# Patient Record
Sex: MALE | Race: WHITE | NOT HISPANIC OR LATINO | Employment: OTHER | URBAN - METROPOLITAN AREA
[De-identification: names, ages, dates, MRNs, and addresses within clinical notes are randomized per-mention and may not be internally consistent; named-entity substitution may affect disease eponyms.]

---

## 2018-01-30 PROBLEM — R20.0 NUMBNESS: Status: ACTIVE | Noted: 2018-01-30

## 2018-02-01 PROBLEM — I63.9 STROKE: Status: ACTIVE | Noted: 2018-02-01

## 2018-02-01 PROBLEM — I74.9 TIA DUE TO EMBOLISM: Status: ACTIVE | Noted: 2018-02-01

## 2018-02-01 PROBLEM — G45.9 TIA DUE TO EMBOLISM: Status: ACTIVE | Noted: 2018-02-01

## 2018-02-01 PROBLEM — G45.9 TIA DUE TO EMBOLISM: Status: RESOLVED | Noted: 2018-02-01 | Resolved: 2018-02-01

## 2018-02-01 PROBLEM — I74.9 TIA DUE TO EMBOLISM: Status: RESOLVED | Noted: 2018-02-01 | Resolved: 2018-02-01

## 2018-02-01 PROBLEM — R53.1 WEAKNESS: Status: ACTIVE | Noted: 2018-02-01

## 2018-02-01 PROBLEM — I63.9 STROKE: Status: RESOLVED | Noted: 2018-02-01 | Resolved: 2018-02-01

## 2019-06-05 PROBLEM — E86.0 DEHYDRATION: Status: ACTIVE | Noted: 2019-06-05

## 2019-06-08 PROBLEM — R07.9 CHEST PAIN: Status: RESOLVED | Noted: 2019-06-08 | Resolved: 2019-06-08

## 2019-06-08 PROBLEM — E86.0 DEHYDRATION: Status: RESOLVED | Noted: 2019-06-05 | Resolved: 2019-06-08

## 2019-06-08 PROBLEM — R07.9 CHEST PAIN: Status: ACTIVE | Noted: 2019-06-08

## 2019-06-08 PROBLEM — N39.0 URINARY TRACT INFECTION WITHOUT HEMATURIA: Status: ACTIVE | Noted: 2019-06-08

## 2019-06-14 ENCOUNTER — TELEPHONE (OUTPATIENT)
Dept: UROLOGY | Facility: CLINIC | Age: 84
End: 2019-06-14

## 2019-06-14 DIAGNOSIS — R33.9 URINARY RETENTION: Primary | ICD-10-CM

## 2019-06-17 ENCOUNTER — ANESTHESIA EVENT (OUTPATIENT)
Dept: SURGERY | Facility: HOSPITAL | Age: 84
End: 2019-06-17
Payer: MEDICARE

## 2019-06-19 ENCOUNTER — ANESTHESIA (OUTPATIENT)
Dept: SURGERY | Facility: HOSPITAL | Age: 84
End: 2019-06-19
Payer: MEDICARE

## 2019-06-19 ENCOUNTER — HOSPITAL ENCOUNTER (OUTPATIENT)
Facility: HOSPITAL | Age: 84
Discharge: HOME | End: 2019-06-19
Attending: UROLOGY | Admitting: UROLOGY
Payer: MEDICARE

## 2019-06-19 DIAGNOSIS — R33.9 URINARY RETENTION: ICD-10-CM

## 2019-06-19 PROCEDURE — 63600175 PHARM REV CODE 636 W HCPCS: Mod: PO | Performed by: NURSE ANESTHETIST, CERTIFIED REGISTERED

## 2019-06-19 PROCEDURE — 36000707: Mod: PO | Performed by: UROLOGY

## 2019-06-19 PROCEDURE — 36000706: Mod: PO | Performed by: UROLOGY

## 2019-06-19 PROCEDURE — 25000003 PHARM REV CODE 250: Mod: PO | Performed by: ANESTHESIOLOGY

## 2019-06-19 PROCEDURE — 71000033 HC RECOVERY, INTIAL HOUR: Mod: PO | Performed by: UROLOGY

## 2019-06-19 PROCEDURE — 71000015 HC POSTOP RECOV 1ST HR: Mod: PO | Performed by: UROLOGY

## 2019-06-19 PROCEDURE — 37000008 HC ANESTHESIA 1ST 15 MINUTES: Mod: PO | Performed by: UROLOGY

## 2019-06-19 PROCEDURE — D9220A PRA ANESTHESIA: ICD-10-PCS | Mod: CRNA,,, | Performed by: NURSE ANESTHETIST, CERTIFIED REGISTERED

## 2019-06-19 PROCEDURE — 25000003 PHARM REV CODE 250: Mod: PO | Performed by: NURSE ANESTHETIST, CERTIFIED REGISTERED

## 2019-06-19 PROCEDURE — D9220A PRA ANESTHESIA: Mod: ANES,,, | Performed by: ANESTHESIOLOGY

## 2019-06-19 PROCEDURE — 37000009 HC ANESTHESIA EA ADD 15 MINS: Mod: PO | Performed by: UROLOGY

## 2019-06-19 PROCEDURE — D9220A PRA ANESTHESIA: ICD-10-PCS | Mod: ANES,,, | Performed by: ANESTHESIOLOGY

## 2019-06-19 PROCEDURE — C1894 INTRO/SHEATH, NON-LASER: HCPCS | Mod: PO | Performed by: UROLOGY

## 2019-06-19 PROCEDURE — D9220A PRA ANESTHESIA: Mod: CRNA,,, | Performed by: NURSE ANESTHETIST, CERTIFIED REGISTERED

## 2019-06-19 RX ORDER — HYDROCODONE BITARTRATE AND ACETAMINOPHEN 5; 325 MG/1; MG/1
1 TABLET ORAL EVERY 6 HOURS PRN
Qty: 10 TABLET | Refills: 0 | Status: SHIPPED | OUTPATIENT
Start: 2019-06-19

## 2019-06-19 RX ORDER — OXYCODONE HYDROCHLORIDE 5 MG/1
5 TABLET ORAL
Status: DISCONTINUED | OUTPATIENT
Start: 2019-06-19 | End: 2019-06-19 | Stop reason: HOSPADM

## 2019-06-19 RX ORDER — ONDANSETRON 2 MG/ML
INJECTION INTRAMUSCULAR; INTRAVENOUS
Status: DISCONTINUED | OUTPATIENT
Start: 2019-06-19 | End: 2019-06-19

## 2019-06-19 RX ORDER — SODIUM CHLORIDE 0.9 % (FLUSH) 0.9 %
3 SYRINGE (ML) INJECTION
Status: DISCONTINUED | OUTPATIENT
Start: 2019-06-19 | End: 2019-06-19 | Stop reason: HOSPADM

## 2019-06-19 RX ORDER — ETOMIDATE 2 MG/ML
INJECTION INTRAVENOUS
Status: DISCONTINUED | OUTPATIENT
Start: 2019-06-19 | End: 2019-06-19

## 2019-06-19 RX ORDER — HYDROCODONE BITARTRATE AND ACETAMINOPHEN 5; 325 MG/1; MG/1
1 TABLET ORAL EVERY 4 HOURS PRN
Status: DISCONTINUED | OUTPATIENT
Start: 2019-06-19 | End: 2019-06-19 | Stop reason: HOSPADM

## 2019-06-19 RX ORDER — LIDOCAINE HYDROCHLORIDE 10 MG/ML
1 INJECTION, SOLUTION EPIDURAL; INFILTRATION; INTRACAUDAL; PERINEURAL ONCE
Status: DISCONTINUED | OUTPATIENT
Start: 2019-06-19 | End: 2019-06-19 | Stop reason: HOSPADM

## 2019-06-19 RX ORDER — SODIUM CHLORIDE, SODIUM LACTATE, POTASSIUM CHLORIDE, CALCIUM CHLORIDE 600; 310; 30; 20 MG/100ML; MG/100ML; MG/100ML; MG/100ML
INJECTION, SOLUTION INTRAVENOUS CONTINUOUS
Status: DISCONTINUED | OUTPATIENT
Start: 2019-06-19 | End: 2019-06-19 | Stop reason: HOSPADM

## 2019-06-19 RX ORDER — ONDANSETRON 2 MG/ML
4 INJECTION INTRAMUSCULAR; INTRAVENOUS DAILY PRN
Status: DISCONTINUED | OUTPATIENT
Start: 2019-06-19 | End: 2019-06-19 | Stop reason: HOSPADM

## 2019-06-19 RX ORDER — FENTANYL CITRATE 50 UG/ML
INJECTION, SOLUTION INTRAMUSCULAR; INTRAVENOUS
Status: DISCONTINUED | OUTPATIENT
Start: 2019-06-19 | End: 2019-06-19

## 2019-06-19 RX ORDER — CIPROFLOXACIN 2 MG/ML
INJECTION, SOLUTION INTRAVENOUS
Status: DISCONTINUED | OUTPATIENT
Start: 2019-06-19 | End: 2019-06-19

## 2019-06-19 RX ORDER — LIDOCAINE HCL/PF 100 MG/5ML
SYRINGE (ML) INTRAVENOUS
Status: DISCONTINUED | OUTPATIENT
Start: 2019-06-19 | End: 2019-06-19

## 2019-06-19 RX ORDER — FENTANYL CITRATE 50 UG/ML
25 INJECTION, SOLUTION INTRAMUSCULAR; INTRAVENOUS EVERY 5 MIN PRN
Status: DISCONTINUED | OUTPATIENT
Start: 2019-06-19 | End: 2019-06-19 | Stop reason: HOSPADM

## 2019-06-19 RX ADMIN — ONDANSETRON 4 MG: 2 INJECTION, SOLUTION INTRAMUSCULAR; INTRAVENOUS at 12:06

## 2019-06-19 RX ADMIN — ETOMIDATE 32 MG: 2 INJECTION INTRAVENOUS at 12:06

## 2019-06-19 RX ADMIN — CIPROFLOXACIN 400 MG: 2 INJECTION, SOLUTION INTRAVENOUS at 12:06

## 2019-06-19 RX ADMIN — LIDOCAINE HYDROCHLORIDE 75 MG: 20 INJECTION PARENTERAL at 12:06

## 2019-06-19 RX ADMIN — FENTANYL CITRATE 25 MCG: 50 INJECTION, SOLUTION INTRAMUSCULAR; INTRAVENOUS at 12:06

## 2019-06-19 RX ADMIN — SODIUM CHLORIDE, SODIUM LACTATE, POTASSIUM CHLORIDE, AND CALCIUM CHLORIDE: .6; .31; .03; .02 INJECTION, SOLUTION INTRAVENOUS at 11:06

## 2019-06-19 NOTE — ANESTHESIA POSTPROCEDURE EVALUATION
Anesthesia Post Evaluation    Patient: Triston Marie    Procedure(s) Performed: Procedure(s) (LRB):  INSERTION, SUPRAPUBIC CATHETER (N/A)    Final Anesthesia Type: general  Patient location during evaluation: PACU  Patient participation: Yes- Able to Participate  Level of consciousness: awake and alert  Post-procedure vital signs: reviewed and stable  Pain management: adequate  Airway patency: patent  PONV status at discharge: No PONV  Anesthetic complications: no      Cardiovascular status: blood pressure returned to baseline and hemodynamically stable  Respiratory status: unassisted  Hydration status: euvolemic  Follow-up not needed.          Vitals Value Taken Time   /78 6/19/2019  1:15 PM   Temp 36.3 °C (97.3 °F) 6/19/2019 12:53 PM   Pulse 73 6/19/2019  1:15 PM   Resp 16 6/19/2019  1:15 PM   SpO2  6/19/2019  1:29 PM         Event Time     Out of Recovery 13:27:00          Pain/Nakia Score: Nakia Score: 9 (6/19/2019  1:27 PM)

## 2019-06-19 NOTE — PLAN OF CARE
Discharge instructions reviewed with pt and friend. Understanding verbalized. Alexander care education occurred. Paper prescription given. No complaints of pain reported pt able to tolerate po intake. Instructions and supplies given. Transported to car by RN.

## 2019-06-19 NOTE — DISCHARGE INSTRUCTIONS
PATIENT INSTRUCTIONS  POST-ANESTHESIA    IMMEDIATELY FOLLOWING SURGERY:  Do not drive or operate machinery for the first twenty four hours after surgery.  Do not make any important decisions for twenty four hours after surgery or while taking narcotic pain medications or sedatives.  If you develop intractable nausea and vomiting or a severe headache please notify your doctor immediately.    FOLLOW-UP:  Please make an appointment with your surgeon as instructed. You do not need to follow up with anesthesia unless specifically instructed to do so.    WOUND CARE INSTRUCTIONS (if applicable):  Keep a dry clean dressing on the anesthesia/puncture wound site if there is drainage.  Once the wound has quit draining you may leave it open to air.  Generally you should leave the bandage intact for twenty four hours unless there is drainage.  If the epidural site drains for more than 36-48 hours please call the anesthesia department.    QUESTIONS?:  Please feel free to call your physician or the hospital  if you have any questions, and they will be happy to assist you.         Recovery After Procedural Sedation (Adult)  You have been given medicine by vein to make you sleep during your surgery. This may have included both a pain medicine and sleeping medicine. Most of the effects have worn off. But you may still have some drowsiness for the next 6 to 8 hours.  Home care  Follow these guidelines when you get home:  · For the next 8 hours, you should be watched by a responsible adult. This person should make sure your condition is not getting worse.  · Don't drink any alcohol for the next 24 hours.  · Don't drive, operate dangerous machinery, or make important business or personal decisions during the next 24 hours.  Note: Your healthcare provider may tell you not to take any medicine by mouth for pain or sleep in the next 4 hours. These medicines may react with the medicines you were given in the hospital. This could  "cause a much stronger response than usual.  Follow-up care  Follow up with your healthcare provider if you are not alert and back to your usual level of activity within 12 hours.  When to seek medical advice  Call your healthcare provider right away if any of these occur:  · Drowsiness gets worse  · Weakness or dizziness gets worse  · Repeated vomiting  · You can't be awakened   Date Last Reviewed: 10/18/2016  © 2234-2484 [employer]. [address] [place] [address], [place] [address]. All rights reserved. This information is not intended as a substitute for professional medical care. Always follow your healthcare professional's instructions.        Discharge Instructions: Caring for Your Suprapubic Catheter  You are going home with a suprapubic catheter in place. This tube is placed directly into the bladder through your abdomen to drain urine from your bladder. You were shown how to care for your catheter in the hospital. This sheet will help remind you of those steps and guidelines when you are at home.  Home care  · Shower as necessary.   · Change your dressing every day. Change the dressing more often if it falls off, becomes dirty, or has absorbed a lot of drainage.  Gather your supplies  · Tape  · Povidone-iodine ointment  · Cotton swabs  · Wastebasket and plastic bag  · Povidone-iodine swab sticks (or cotton balls and povidone-iodine solution)  · Dressing sponges (4" x 4") that are cut or split [facility] into the middle  Remove the dressing and check for problems  · Wash your hands thoroughly before and after all catheter care.  · Gently remove the old dressing if you have one.  ¨ Dont pull on the tube.  ¨ Check the dressing for drainage. Notice whether anything looks unusual or smells bad.  ¨ Place your dressing in the plastic bag and throw it away in the wastebasket.  · Now look at the place where the catheter leaves your body (exit site).  ¨ Note any swelling, bleeding, irritation, unusual or smelly " "drainage.  ¨ Also check for any sores next to the exit site. Sores form around the exit site if there is too much pressure from the tube on the skin.  Clean the area  · Wash around the shield gently with soap and water.  · Use a povidone-iodine swab stick to clean under the shield.  ¨ Clean around the exit site of the catheter.  ¨ Start at the exit site and clean outward in a circular motion, about 3 to 4 inches from the site.Dont clean back toward the tube.  ¨ Throw away the used swab stick and repeat the cleaning procedure with a new one.  ¨ Let your skin dry completely.  · Place a split 4" x 4" sponge around the catheter. Tape it in place.  · Smear a thin layer of povidone-iodine ointment around the catheter with a cotton swab.  Follow-up care  Make a follow-up appointment or as advised.  When to call your healthcare provider  Call your health care provider right away if you have any of the following:  · Catheter that falls out, or is clogged or feels clogged  · Stitches that fall out  · Urine leaking around catheter  · Urine that is cloudy, bloody, or smells bad  · No urine drainage  · Bladder that feels full or painful  · Rash, itching, redness, swelling, or drainage at the catheter site  · Fever above 100.4°F (38.°C) or shaking chills   Date Last Reviewed: 1/1/2017  © 8598-9003 [employer]. [address] [place] [address]. All rights reserved. This information is not intended as a substitute for professional medical care. Always follow your healthcare professional's instructions.      Discharge Instructions: Bladder Training with a Suprapubic Catheter  You are going home with a suprapubic catheter, a tube used to drain urine from your bladder. Your doctor placed the catheter directly into your bladder through a tiny incision in your abdomen. You will need to train your bladder to work as it did before. It takes time after illness or injury for you to feel the urge to urinate. And your " bladder may not empty completely. Because of this, you will need to check the amount of urine in your bladder.  By clamping and unclamping the catheter, you will learn to urinate the way you did before you received the catheter. The amount of urine that you pass through the urethra will increase, and the amount of urine draining from your catheter will decrease. When you reach less than 50 to 75 ml from your catheter for several days, your doctor may want to remove your catheter.  Home care  · Use the bathroom at least every 3 hours.  · Try to pass your urine normally into a container. The container should be a measuring container, so you can determine the amount of urine you passed. Don't be discouraged if this takes some time for you to learn.  · Measure the amount of urine in the container.  · Unclamp your catheter.  · Drain the urine from your catheter.  · Measure the amount of urine.  · Clamp your catheter.  · Write down the amount of urine that you passed normally and the amount from your catheter.  · Add the two amounts together and record the total. Also record the date and time.  · Try to increase the amount of urine you pass normally and decrease the amount left in your catheter.  · Tell your doctor when you are draining less than 50 to 75 ml from your catheter.  Follow-up care  Make a follow-up appointment as directed by our staff.  When to call your healthcare provider  Call your doctor right away if you have any of the following:  · Urine that is cloudy, bloody, or smells bad  · Fever above 100.4°F (38°C) or shaking chills  · Rash, itching, redness, swelling, or drainage at the catheter site  · Full feeling in your bladder or bladder pain  · Catheter that is clogged or feels clogged  · No urine drainage  · Urine leaking around catheter  · Catheter or stitches that fall out   Date Last Reviewed: 1/1/2017  © 2848-8211 [employer]. [address] [place] [address], [place] [address]. All rights  reserved. This information is not intended as a substitute for professional medical care. Always follow your healthcare professional's instructions.

## 2019-06-19 NOTE — TRANSFER OF CARE
"Anesthesia Transfer of Care Note    Patient: Triston Marie    Procedure(s) Performed: Procedure(s) (LRB):  INSERTION, SUPRAPUBIC CATHETER (N/A)    Patient location: PACU    Anesthesia Type: general    Transport from OR: Transported from OR on room air with adequate spontaneous ventilation    Post pain: adequate analgesia    Post assessment: no apparent anesthetic complications and tolerated procedure well    Post vital signs: stable    Level of consciousness: awake    Nausea/Vomiting: no nausea/vomiting    Complications: none    Transfer of care protocol was followed      Last vitals:   Visit Vitals  BP (!) 118/59 (BP Location: Right arm, Patient Position: Lying)   Pulse 86   Temp 36.4 °C (97.5 °F) (Temporal)   Resp 20   Ht 6' 1.5" (1.867 m)   Wt 74.8 kg (165 lb)   BMI 21.47 kg/m²     "

## 2019-06-19 NOTE — H&P
[facility]  Urology  History & Physical    Patient Name: Triston Marie  MRN: [id number]  Admission Date: 6/19/2019  Code Status: Prior   Attending Provider: Kurt Allen MD   Primary Care Physician: Romulo Billingsley MD  Principal Problem:<principal problem not specified>    Subjective:     HPI: 83 yo M with urinary retention    Past Medical History:   Diagnosis Date    A-fib     COPD (chronic obstructive pulmonary disease)     bronchitis    Coronary artery disease     Cystitis     Hypertension     Pacemaker     TIA due to embolism        Past Surgical History:   Procedure Laterality Date    APPENDECTOMY      CORONARY ARTERY BYPASS GRAFT         Review of patient's allergies indicates:   Allergen Reactions    Penicillins        Family History     None          Tobacco Use    Smoking status: Former Smoker    Smokeless tobacco: Never Used   Substance and Sexual Activity    Alcohol use: No    Drug use: No    Sexual activity: Not on file       Review of Systems   Constitutional: Negative for fatigue and fever.   HENT: Negative for nosebleeds.    Respiratory: Negative for cough and shortness of breath.    Cardiovascular: Negative for chest pain and leg swelling.   Gastrointestinal: Negative for abdominal distention, abdominal pain and nausea.   Genitourinary: Negative for difficulty urinating, flank pain, hematuria and urgency.   Musculoskeletal: Negative for back pain.   Skin: Negative for color change.   Neurological: Negative for weakness.   Hematological: Negative for adenopathy. Does not bruise/bleed easily.   Psychiatric/Behavioral: Negative for confusion.       Objective:     Temp:  [97.5 °F (36.4 °C)] 97.5 °F (36.4 °C)  Pulse:  [86] 86  Resp:  [20] 20  BP: (118)/(59) 118/59     Body mass index is 21.47 kg/m².    No intake/output data recorded.       Lines/Drains/Airways     Peripheral Intravenous Line                 Peripheral IV - Single Lumen 06/19/19 1145 20 G Right Hand  less than 1 day                Physical Exam   Vitals reviewed.  Constitutional: He is oriented to person, place, and time. He appears well-developed.   HENT:   Head: Normocephalic.   Cardiovascular: Normal rate.    Pulmonary/Chest: Effort normal.   Abdominal: Soft. He exhibits no distension. There is no tenderness.   Musculoskeletal: He exhibits no edema.   Neurological: He is alert and oriented to person, place, and time.   Skin: Skin is warm.     Psychiatric: He has a normal mood and affect.       Significant Labs:  BMP:  No results for input(s): NA, K, CL, CO2, BUN, CREATININE, LABGLOM, GLUCOSE, CALCIUM in the last 168 hours.    CBC:  No results for input(s): WBC, HGB, HCT, PLT in the last 168 hours.    Recent Lab Results     None          Significant Imaging:  All pertinent imaging results/findings from the past 24 hours have been reviewed.    Assessment and Plan:     There are no hospital problems to display for this patient.      VTE Risk Mitigation (From admission, onward)    None        -plan on spt   I have explained the risk, benefits, and alternatives of the procedure in detail. The patient voices understanding and all questions have been answered. The patient agrees to proceed as planned.      Kurt Allen MD  Urology  [facility]

## 2019-06-19 NOTE — BRIEF OP NOTE
[facility]  Brief Operative Note     SUMMARY     Surgery Date: 6/19/2019     Surgeon(s) and Role:     * Kurt Allen MD - Primary    Assisting Surgeon: None    Pre-op Diagnosis:  Urinary retention [R33.9]    Post-op Diagnosis:  Post-Op Diagnosis Codes:     * Urinary retention [R33.9]    Procedure(s) (LRB):  INSERTION, SUPRAPUBIC CATHETER (N/A)    Anesthesia: Choice    Description of the findings of the procedure: see op note    Findings/Key Components:     Estimated Blood Loss: * No values recorded between 6/19/2019 12:32 PM and 6/19/2019 12:42 PM *         Specimens:   Specimen (12h ago, onward)    None          Discharge Note    SUMMARY     Admit Date: 6/19/2019    Discharge Date and Time:  06/19/2019 12:42 PM    Hospital Course (synopsis of major diagnoses, care, treatment, and services provided during the course of the hospital stay): s/p SPT placement     Final Diagnosis: Post-Op Diagnosis Codes:     * Urinary retention [R33.9]    Disposition: Home or Self Care    Follow Up/Patient Instructions:     Medications:  Reconciled Home Medications:      Medication List      START taking these medications    HYDROcodone-acetaminophen 5-325 mg per tablet  Commonly known as:  NORCO  Take 1 tablet by mouth every 6 (six) hours as needed for Pain.        CONTINUE taking these medications    albuterol 90 mcg/actuation inhaler  Commonly known as:  PROVENTIL/VENTOLIN HFA  Inhale 1-2 puffs into the lungs every 6 (six) hours as needed for Wheezing. Rescue     aspirin 81 MG Chew  Take 81 mg by mouth once daily.     metoprolol succinate 25 MG 24 hr tablet  Commonly known as:  TOPROL-XL  Take 12.5 mg by mouth once daily.     propafenone 150 MG Tab  Commonly known as:  RHTHYMOL  Take 150 mg by mouth every 12 (twelve) hours.          Discharge Procedure Orders   Diet general     Call MD for:  temperature >100.4     Call MD for:  persistent nausea and vomiting     Call MD for:  severe uncontrolled pain      Call MD for:  difficulty breathing, headache or visual disturbances     Shower on day dressing removed (No bath)     Follow-up Information     Kurt Allen MD In 1 month.    Specialty:  Urology  Contact information:  [address] [place] [address] UROLOGY  [facility] [address] [phone]

## 2019-06-19 NOTE — ANESTHESIA PREPROCEDURE EVALUATION
06/19/2019  Triston Marie is a 84 y.o., male.    Anesthesia Evaluation    I have reviewed the Patient Summary Reports.    I have reviewed the Nursing Notes.      Review of Systems  Anesthesia Hx:  No problems with previous Anesthesia    Cardiovascular:   Hypertension, well controlled CAD asymptomatic CABG/stent Dysrhythmias atrial fibrillation ECG has been reviewed. CAD.  S/P CABG.  Stable.  Hx of a-fib   Pulmonary:   COPD, mild        Physical Exam  General:  Well nourished    Airway/Jaw/Neck:  Airway Findings: Mallampati: II                Anesthesia Plan  Type of Anesthesia, risks & benefits discussed:  Anesthesia Type:  general  Patient's Preference:   Intra-op Monitoring Plan:   Intra-op Monitoring Plan Comments:   Post Op Pain Control Plan:   Post Op Pain Control Plan Comments:   Induction:   IV  Beta Blocker:  Patient is not currently on a Beta-Blocker (No further documentation required).       Informed Consent: Patient understands risks and agrees with Anesthesia plan.  Questions answered. Anesthesia consent signed with patient.  ASA Score: 3     Day of Surgery Review of History & Physical:    H&P update referred to the surgeon.         Ready For Surgery From Anesthesia Perspective.

## 2019-06-19 NOTE — PROGRESS NOTES
"Pt and friend at bedside express confusion on new location of bladder cath and concerns of how long new cath will be in place. Pt states "I was not told it was going to be put there". Clinic paged for further clarification. Pt and friend educated by RN and Charge Rn on how to care for new cath. MD Allen notified stated pt is okay to discharge home and will be able to care for new valentine. Home health nurse to address care at home. MD stated pt will be followed by clinic staff.   "

## 2019-06-20 ENCOUNTER — TELEPHONE (OUTPATIENT)
Dept: SURGERY | Facility: HOSPITAL | Age: 84
End: 2019-06-20

## 2019-06-20 VITALS
TEMPERATURE: 97 F | HEIGHT: 74 IN | BODY MASS INDEX: 21.17 KG/M2 | RESPIRATION RATE: 14 BRPM | WEIGHT: 165 LBS | SYSTOLIC BLOOD PRESSURE: 130 MMHG | DIASTOLIC BLOOD PRESSURE: 71 MMHG | HEART RATE: 75 BPM

## 2019-06-20 NOTE — TELEPHONE ENCOUNTER
During post op phone call patient complained of bleeding from the suprapubic site. Can you please contact patient at your convenience.

## 2019-06-27 NOTE — OP NOTE
[facility]  Surgery Department  Operative Note    SUMMARY     Date of Procedure: 6/19/2019     Procedure: Procedure(s) (LRB):  INSERTION, SUPRAPUBIC CATHETER (N/A)     Surgeon(s) and Role:     * Kurt Allen MD - Primary    Assisting Surgeon: None    Pre-Operative Diagnosis: Urinary retention [R33.9]    Post-Operative Diagnosis: Post-Op Diagnosis Codes:     * Urinary retention [R33.9]    Anesthesia: Choice        Description of the Findings of the Procedure: The patient was taken to the Operating Room anesthesia was administered, timeout was performed, antibiotics were given. He was then placed in lithotomy position and prepped and draped.  A 22 fr cystoscope is inserted into the bladder.  NO stones or lesions are seen.  No obstruction is seen.   Approximately two finger breadths above the pubic symphasis, a spinal needle is advanced while aspirating until it is seen at the dome of the bladder.  A stab incision is then made and the suprapubic introducer is advanced under direct visualization.  The inner trochar is removed and a 16 fr valentine is placed and secured.        Complications: No    Estimated Blood Loss (EBL): * No values recorded between 6/19/2019 12:32 PM and 6/19/2019 12:51 PM *                       Condition: Good    Disposition: PACU - hemodynamically stable.

## (undated) DEVICE — BAG LEG URIDRAIN 25OZ

## (undated) DEVICE — BLADE SURG STAINLESS STEEL #11

## (undated) DEVICE — NDL SPINAL 18GX3.5 SPINOCAN

## (undated) DEVICE — DRESSING GAUZE 6PLY 4X4

## (undated) DEVICE — SUT SILK 2-0 PS 18IN BLACK

## (undated) DEVICE — SOL NACL IRR 1000ML BTL

## (undated) DEVICE — INTRODUCER SUPRA ONE STE 18FR

## (undated) DEVICE — DRESSING GZ XRAY 12PLY 4X8 STR

## (undated) DEVICE — SET Y-TYPE TUR IRRIGATION

## (undated) DEVICE — Device

## (undated) DEVICE — SOL IRR NACL .9% 3000ML